# Patient Record
Sex: MALE | Race: BLACK OR AFRICAN AMERICAN
[De-identification: names, ages, dates, MRNs, and addresses within clinical notes are randomized per-mention and may not be internally consistent; named-entity substitution may affect disease eponyms.]

---

## 2021-03-06 ENCOUNTER — HOSPITAL ENCOUNTER (EMERGENCY)
Dept: HOSPITAL 35 - ER | Age: 21
Discharge: HOME | End: 2021-03-06
Payer: COMMERCIAL

## 2021-03-06 VITALS — BODY MASS INDEX: 25.18 KG/M2 | HEIGHT: 69 IN | WEIGHT: 170 LBS

## 2021-03-06 VITALS — DIASTOLIC BLOOD PRESSURE: 72 MMHG | SYSTOLIC BLOOD PRESSURE: 98 MMHG

## 2021-03-06 DIAGNOSIS — K56.1: Primary | ICD-10-CM

## 2021-03-06 DIAGNOSIS — K29.70: ICD-10-CM

## 2021-03-06 LAB
ALBUMIN SERPL-MCNC: 3.9 G/DL (ref 3.4–5)
ALT SERPL-CCNC: 124 U/L (ref 16–63)
ANION GAP SERPL CALC-SCNC: 12 MMOL/L (ref 7–16)
AST SERPL-CCNC: 248 U/L (ref 15–37)
BASOPHILS NFR BLD AUTO: 0.4 % (ref 0–2)
BILIRUB SERPL-MCNC: 1.2 MG/DL (ref 0.2–1)
BUN SERPL-MCNC: 10 MG/DL (ref 7–18)
CALCIUM SERPL-MCNC: 8.4 MG/DL (ref 8.5–10.1)
CHLORIDE SERPL-SCNC: 102 MMOL/L (ref 98–107)
CO2 SERPL-SCNC: 26 MMOL/L (ref 21–32)
CREAT SERPL-MCNC: 1.2 MG/DL (ref 0.7–1.3)
EOSINOPHIL NFR BLD: 0.2 % (ref 0–3)
ERYTHROCYTE [DISTWIDTH] IN BLOOD BY AUTOMATED COUNT: 14.4 % (ref 10.5–14.5)
GLUCOSE SERPL-MCNC: 143 MG/DL (ref 74–106)
GRANULOCYTES NFR BLD MANUAL: 84.3 % (ref 36–66)
HCT VFR BLD CALC: 40.3 % (ref 42–52)
HGB BLD-MCNC: 13.1 GM/DL (ref 14–18)
LYMPHOCYTES NFR BLD AUTO: 12.8 % (ref 24–44)
MCH RBC QN AUTO: 27.6 PG (ref 26–34)
MCHC RBC AUTO-ENTMCNC: 32.4 G/DL (ref 28–37)
MCV RBC: 85.3 FL (ref 80–100)
MONOCYTES NFR BLD: 2.3 % (ref 1–8)
NEUTROPHILS # BLD: 11.1 THOU/UL (ref 1.4–8.2)
PLATELET # BLD: 242 THOU/UL (ref 150–400)
POTASSIUM SERPL-SCNC: 3.5 MMOL/L (ref 3.5–5.1)
PROT SERPL-MCNC: 7.2 G/DL (ref 6.4–8.2)
RBC # BLD AUTO: 4.72 MIL/UL (ref 4.5–6)
SODIUM SERPL-SCNC: 140 MMOL/L (ref 136–145)
WBC # BLD AUTO: 13.2 THOU/UL (ref 4–11)

## 2021-03-08 NOTE — EKG
[address]  [address]
Phone:  [phone]                    ELECTROCARDIOGRAM REPORT      
_______________________________________________________________________________
 
Name:       DARSHANA KLEIN          Room #:                     DEP   
LUIS EDUARDO#:      [id number]     Account #:      [id number]  
Admission:  21    Attend Phys:                          
Discharge:  21    Date of Birth:  [birth year]  
                                                          Report #: [id number]
   [id number]
_______________________________________________________________________________
                         [facility] ED
                                       
Test Date:    2021               Test Time:    01:42:19
Pat Name:     DARSHANA KLEIN             Department:   
Patient ID:   [id number]            Room:          
Gender:                               Technician:   tiki
:          2000               Requested By: Rudy Pedraza
Order Number: [id number] MD:   José Wiley
                                 Measurements
Intervals                              Axis          
Rate:         87                       P:            52
HI:           190                      QRS:          63
QRSD:         88                       T:            15
QT:           360                                    
QTc:          433                                    
                           Interpretive Statements
Sinus rhythm
No previous ECG available for comparison
Electronically Signed On 3-8-2021 7:36:13 CST by Jsoé Wiley
[email or web address]
 
 
 
 
 
 
 
 
 
 
 
 
 
 
 
 
 
 
 
 
 
 
 
  <ELECTRONICALLY SIGNED>
   By: José Wiley MD, [facility]    
  21     0736
D: 21 0142                           _____________________________________
T: 21 0142                           José Wiley MD, FACC      /EPI